# Patient Record
Sex: MALE | Race: WHITE | NOT HISPANIC OR LATINO
[De-identification: names, ages, dates, MRNs, and addresses within clinical notes are randomized per-mention and may not be internally consistent; named-entity substitution may affect disease eponyms.]

---

## 2018-10-25 ENCOUNTER — APPOINTMENT (OUTPATIENT)
Dept: OTOLARYNGOLOGY | Facility: CLINIC | Age: 48
End: 2018-10-25

## 2018-10-26 ENCOUNTER — APPOINTMENT (OUTPATIENT)
Dept: OTOLARYNGOLOGY | Facility: CLINIC | Age: 48
End: 2018-10-26
Payer: COMMERCIAL

## 2018-10-26 VITALS
DIASTOLIC BLOOD PRESSURE: 57 MMHG | HEIGHT: 70 IN | SYSTOLIC BLOOD PRESSURE: 128 MMHG | BODY MASS INDEX: 27.2 KG/M2 | HEART RATE: 86 BPM | WEIGHT: 190 LBS

## 2018-10-26 PROCEDURE — 31231 NASAL ENDOSCOPY DX: CPT

## 2018-10-26 PROCEDURE — 99204 OFFICE O/P NEW MOD 45 MIN: CPT | Mod: 25

## 2018-12-01 ENCOUNTER — OUTPATIENT (OUTPATIENT)
Dept: OUTPATIENT SERVICES | Facility: HOSPITAL | Age: 48
LOS: 1 days | End: 2018-12-01
Payer: COMMERCIAL

## 2018-12-01 VITALS
HEIGHT: 69 IN | SYSTOLIC BLOOD PRESSURE: 114 MMHG | DIASTOLIC BLOOD PRESSURE: 80 MMHG | WEIGHT: 194.01 LBS | OXYGEN SATURATION: 98 % | HEART RATE: 55 BPM | TEMPERATURE: 97 F | RESPIRATION RATE: 14 BRPM

## 2018-12-01 DIAGNOSIS — S62.102A FRACTURE OF UNSPECIFIED CARPAL BONE, LEFT WRIST, INITIAL ENCOUNTER FOR CLOSED FRACTURE: Chronic | ICD-10-CM

## 2018-12-01 DIAGNOSIS — Z98.890 OTHER SPECIFIED POSTPROCEDURAL STATES: Chronic | ICD-10-CM

## 2018-12-01 DIAGNOSIS — I80.229 PHLEBITIS AND THROMBOPHLEBITIS OF UNSPECIFIED POPLITEAL VEIN: ICD-10-CM

## 2018-12-01 DIAGNOSIS — J34.2 DEVIATED NASAL SEPTUM: ICD-10-CM

## 2018-12-01 DIAGNOSIS — M25.569 PAIN IN UNSPECIFIED KNEE: ICD-10-CM

## 2018-12-01 LAB
BUN SERPL-MCNC: 17 MG/DL — SIGNIFICANT CHANGE UP (ref 7–23)
CALCIUM SERPL-MCNC: 9.2 MG/DL — SIGNIFICANT CHANGE UP (ref 8.4–10.5)
CHLORIDE SERPL-SCNC: 103 MMOL/L — SIGNIFICANT CHANGE UP (ref 98–107)
CO2 SERPL-SCNC: 24 MMOL/L — SIGNIFICANT CHANGE UP (ref 22–31)
CREAT SERPL-MCNC: 0.9 MG/DL — SIGNIFICANT CHANGE UP (ref 0.5–1.3)
GLUCOSE SERPL-MCNC: 84 MG/DL — SIGNIFICANT CHANGE UP (ref 70–99)
HCT VFR BLD CALC: 41.1 % — SIGNIFICANT CHANGE UP (ref 39–50)
HGB BLD-MCNC: 13.5 G/DL — SIGNIFICANT CHANGE UP (ref 13–17)
MCHC RBC-ENTMCNC: 31.3 PG — SIGNIFICANT CHANGE UP (ref 27–34)
MCHC RBC-ENTMCNC: 32.8 % — SIGNIFICANT CHANGE UP (ref 32–36)
MCV RBC AUTO: 95.4 FL — SIGNIFICANT CHANGE UP (ref 80–100)
NRBC # FLD: 0 — SIGNIFICANT CHANGE UP
PLATELET # BLD AUTO: 181 K/UL — SIGNIFICANT CHANGE UP (ref 150–400)
PMV BLD: 10 FL — SIGNIFICANT CHANGE UP (ref 7–13)
POTASSIUM SERPL-MCNC: 4.5 MMOL/L — SIGNIFICANT CHANGE UP (ref 3.5–5.3)
POTASSIUM SERPL-SCNC: 4.5 MMOL/L — SIGNIFICANT CHANGE UP (ref 3.5–5.3)
RBC # BLD: 4.31 M/UL — SIGNIFICANT CHANGE UP (ref 4.2–5.8)
RBC # FLD: 12.8 % — SIGNIFICANT CHANGE UP (ref 10.3–14.5)
SODIUM SERPL-SCNC: 139 MMOL/L — SIGNIFICANT CHANGE UP (ref 135–145)
WBC # BLD: 3.95 K/UL — SIGNIFICANT CHANGE UP (ref 3.8–10.5)
WBC # FLD AUTO: 3.95 K/UL — SIGNIFICANT CHANGE UP (ref 3.8–10.5)

## 2018-12-01 PROCEDURE — 93010 ELECTROCARDIOGRAM REPORT: CPT

## 2018-12-01 RX ORDER — SODIUM CHLORIDE 9 MG/ML
1000 INJECTION, SOLUTION INTRAVENOUS
Qty: 0 | Refills: 0 | Status: DISCONTINUED | OUTPATIENT
Start: 2018-12-11 | End: 2018-12-12

## 2018-12-01 NOTE — H&P PST ADULT - ENMT COMMENTS
DX: DX: chronic ethmoidal, sphenoidal sinusitis, deviated nasal septum, chronic maxillary, frontal sinusitis and hypertrophy of nasal turbinates.

## 2018-12-01 NOTE — H&P PST ADULT - NEGATIVE CARDIOVASCULAR SYMPTOMS
no peripheral edema/no orthopnea/no claudication/no dyspnea on exertion/no palpitations/no paroxysmal nocturnal dyspnea/no chest pain

## 2018-12-01 NOTE — H&P PST ADULT - PMH
DVT (deep venous thrombosis)    Femur fracture, left DVT (deep venous thrombosis)    Femur fracture, left  sx sx 2015  Wrist fracture, left

## 2018-12-01 NOTE — H&P PST ADULT - ASSESSMENT
DX: chronic ethmoidal, sphenoidal sinusitis, deviated nasal septum, chronic maxillary, frontal sinusitis and hypertrophy of nasal turbinates. Pt scheduled for a septoplasty, turbinectomy left interoperative CT guided sphenoidectomy, ethmoidectomy, maxillary antrostomy w/ sinus curettage frontal sinus exploration on 12/11/18.

## 2018-12-01 NOTE — H&P PST ADULT - NEGATIVE ENMT SYMPTOMS
no gum bleeding/no throat pain/no dysphagia/no abnormal taste sensation/no dry mouth/no recurrent cold sores/no nose bleeds

## 2018-12-01 NOTE — H&P PST ADULT - HISTORY OF PRESENT ILLNESS
48 y/o male w/ PMH of Left fractured femur and post op developed a small cloth, denies A/C, resolved. (5 years ago) . Presents to PST w/ a preop dx of chronic ethmoidal, sphenoidal sinusitis, deviated nasal septum, chronic maxillary, frontal sinusitis and hypertrophy of nasal turbinates. Pt scheduled for a septoplasty, turbinectomy left interoperative CT guided sphenoidectomy, ethmoidectomy, maxillary antrostomy w/ sinus curettage frontal sinus exploration on 12/11/18. 48 y/o male w/ PMH of Left fractured femur and post op developed a small cloth, denies A/C, resolved. (5 years ago) . Presents to PST w/ a preop dx of chronic ethmoidal, sphenoidal sinusitis, deviated nasal septum, chronic maxillary, frontal sinusitis and hypertrophy of nasal turbinates. Pt scheduled for a septoplasty, turbinectomy left interoperative CT guided sphenoidectomy, ethmoidectomy, maxillary antrostomy w/ sinus curettage frontal sinus exploration on 12/11/18. Pt states hx of chronic sinus infections and recently had one that required several axb and steroids. Pt went for an evaluation w/ Dr Elmore who ordered a CT scan and revealed blocked sinus cavities pt recommended surgical intervention at this time.

## 2018-12-01 NOTE — H&P PST ADULT - NSANTHOSAYNRD_GEN_A_CORE
never tested/No. HERBERTH screening performed.  STOP BANG Legend: 0-2 = LOW Risk; 3-4 = INTERMEDIATE Risk; 5-8 = HIGH Risk

## 2018-12-01 NOTE — H&P PST ADULT - PROBLEM SELECTOR PLAN 1
Preop Dx: chronic ethmoidal, sphenoidal sinusitis, deviated nasal septum, chronic maxillary, frontal sinusitis and hypertrophy of nasal turbinates. Pt scheduled for a septoplasty, turbinectomy left interoperative CT guided sphenoidectomy, ethmoidectomy, maxillary antrostomy w/ sinus curettage frontal sinus exploration on 12/11/18. Preop instructions provided including NPO status, Pepcid, stop NSAIDs on 12/4. Verbalized understanding.   DVT prophylaxis to be maintained.

## 2018-12-02 PROBLEM — S72.92XA UNSPECIFIED FRACTURE OF LEFT FEMUR, INITIAL ENCOUNTER FOR CLOSED FRACTURE: Chronic | Status: ACTIVE | Noted: 2018-12-01

## 2018-12-10 ENCOUNTER — TRANSCRIPTION ENCOUNTER (OUTPATIENT)
Age: 48
End: 2018-12-10

## 2018-12-11 ENCOUNTER — OUTPATIENT (OUTPATIENT)
Dept: OUTPATIENT SERVICES | Facility: HOSPITAL | Age: 48
LOS: 1 days | Discharge: HOME | End: 2018-12-11
Payer: COMMERCIAL

## 2018-12-11 ENCOUNTER — APPOINTMENT (OUTPATIENT)
Dept: OTOLARYNGOLOGY | Facility: HOSPITAL | Age: 48
End: 2018-12-11

## 2018-12-11 ENCOUNTER — RESULT REVIEW (OUTPATIENT)
Age: 48
End: 2018-12-11

## 2018-12-11 VITALS
WEIGHT: 194.01 LBS | OXYGEN SATURATION: 97 % | SYSTOLIC BLOOD PRESSURE: 115 MMHG | HEIGHT: 69 IN | HEART RATE: 68 BPM | RESPIRATION RATE: 12 BRPM | DIASTOLIC BLOOD PRESSURE: 69 MMHG | TEMPERATURE: 98 F

## 2018-12-11 VITALS
DIASTOLIC BLOOD PRESSURE: 66 MMHG | SYSTOLIC BLOOD PRESSURE: 96 MMHG | RESPIRATION RATE: 14 BRPM | OXYGEN SATURATION: 99 % | HEART RATE: 62 BPM

## 2018-12-11 DIAGNOSIS — J34.2 DEVIATED NASAL SEPTUM: ICD-10-CM

## 2018-12-11 DIAGNOSIS — S62.102A FRACTURE OF UNSPECIFIED CARPAL BONE, LEFT WRIST, INITIAL ENCOUNTER FOR CLOSED FRACTURE: Chronic | ICD-10-CM

## 2018-12-11 DIAGNOSIS — Z98.890 OTHER SPECIFIED POSTPROCEDURAL STATES: Chronic | ICD-10-CM

## 2018-12-11 PROCEDURE — 88305 TISSUE EXAM BY PATHOLOGIST: CPT | Mod: 26

## 2018-12-11 PROCEDURE — 30130 EXCISE INFERIOR TURBINATE: CPT

## 2018-12-11 PROCEDURE — 31253 NSL/SINS NDSC TOTAL: CPT

## 2018-12-11 PROCEDURE — 88311 DECALCIFY TISSUE: CPT | Mod: 26

## 2018-12-11 PROCEDURE — 31288 NASAL/SINUS ENDOSCOPY SURG: CPT | Mod: LT

## 2018-12-11 PROCEDURE — 31267 ENDOSCOPY MAXILLARY SINUS: CPT | Mod: LT

## 2018-12-11 PROCEDURE — 30520 REPAIR OF NASAL SEPTUM: CPT

## 2018-12-11 PROCEDURE — 61782 SCAN PROC CRANIAL EXTRA: CPT

## 2018-12-11 PROCEDURE — 88304 TISSUE EXAM BY PATHOLOGIST: CPT | Mod: 26

## 2018-12-11 RX ORDER — OXYCODONE HYDROCHLORIDE 5 MG/1
1 TABLET ORAL
Qty: 20 | Refills: 0 | OUTPATIENT
Start: 2018-12-11

## 2018-12-11 RX ORDER — IBUPROFEN 200 MG
1 TABLET ORAL
Qty: 0 | Refills: 0 | COMMUNITY

## 2018-12-11 RX ORDER — ACETAMINOPHEN 500 MG
2 TABLET ORAL
Qty: 0 | Refills: 0 | COMMUNITY

## 2018-12-11 RX ORDER — HYDROCODONE BITARTRATE 50 MG/1
1 CAPSULE, EXTENDED RELEASE ORAL
Qty: 0 | Refills: 0 | COMMUNITY

## 2018-12-11 RX ORDER — CEPHALEXIN 500 MG
1 CAPSULE ORAL
Qty: 20 | Refills: 0 | OUTPATIENT
Start: 2018-12-11 | End: 2018-12-20

## 2018-12-11 NOTE — ASU DISCHARGE PLAN (ADULT/PEDIATRIC). - MEDICATION SUMMARY - MEDICATIONS TO TAKE
I will START or STAY ON the medications listed below when I get home from the hospital:    acetaminophen-oxyCODONE 325 mg-5 mg oral tablet  -- 1 tab(s) by mouth 4 to 6 times a day MDD:8  -- Caution federal law prohibits the transfer of this drug to any person other  than the person for whom it was prescribed.  May cause drowsiness.  Alcohol may intensify this effect.  Use care when operating dangerous machinery.  This prescription cannot be refilled.  This product contains acetaminophen.  Do not use  with any other product containing acetaminophen to prevent possible liver damage.  Using more of this medication than prescribed may cause serious breathing problems.    -- Indication: For as needed for pain medication    Keflex 500 mg oral capsule  -- 1 cap(s) by mouth 2 times a day  -- Finish all this medication unless otherwise directed by prescriber.    -- Indication: For antibitoic treatment for 10 days

## 2018-12-11 NOTE — ASU DISCHARGE PLAN (ADULT/PEDIATRIC). - NURSING INSTRUCTIONS
You were given 1000mg IV Tylenol for pain management.  Please DO NOT take any Tylenol containing products, such as  Vicodin, Percocet, Excedrin, many cold preparations for the next 6 hours (until 145p).  DO NOT EXCEED 3000MG OF TYLENOL OVER 24 HOURS.

## 2018-12-12 ENCOUNTER — APPOINTMENT (OUTPATIENT)
Dept: OTOLARYNGOLOGY | Facility: CLINIC | Age: 48
End: 2018-12-12
Payer: COMMERCIAL

## 2018-12-12 PROBLEM — I82.409 ACUTE EMBOLISM AND THROMBOSIS OF UNSPECIFIED DEEP VEINS OF UNSPECIFIED LOWER EXTREMITY: Chronic | Status: ACTIVE | Noted: 2018-12-01

## 2018-12-12 PROBLEM — S62.102A FRACTURE OF UNSPECIFIED CARPAL BONE, LEFT WRIST, INITIAL ENCOUNTER FOR CLOSED FRACTURE: Chronic | Status: ACTIVE | Noted: 2018-12-01

## 2018-12-12 PROCEDURE — 99024 POSTOP FOLLOW-UP VISIT: CPT

## 2018-12-14 LAB — SURGICAL PATHOLOGY STUDY: SIGNIFICANT CHANGE UP

## 2018-12-19 ENCOUNTER — APPOINTMENT (OUTPATIENT)
Dept: OTOLARYNGOLOGY | Facility: CLINIC | Age: 48
End: 2018-12-19
Payer: COMMERCIAL

## 2018-12-19 PROCEDURE — 99024 POSTOP FOLLOW-UP VISIT: CPT

## 2019-03-15 ENCOUNTER — APPOINTMENT (OUTPATIENT)
Dept: OTOLARYNGOLOGY | Facility: CLINIC | Age: 49
End: 2019-03-15

## 2019-08-07 ENCOUNTER — EMERGENCY (EMERGENCY)
Facility: HOSPITAL | Age: 49
LOS: 1 days | Discharge: HOME | End: 2019-08-07
Attending: EMERGENCY MEDICINE
Payer: COMMERCIAL

## 2019-08-07 VITALS
RESPIRATION RATE: 18 BRPM | OXYGEN SATURATION: 97 % | WEIGHT: 184.97 LBS | HEART RATE: 71 BPM | HEIGHT: 70 IN | TEMPERATURE: 98 F | DIASTOLIC BLOOD PRESSURE: 66 MMHG | SYSTOLIC BLOOD PRESSURE: 129 MMHG

## 2019-08-07 VITALS
DIASTOLIC BLOOD PRESSURE: 84 MMHG | SYSTOLIC BLOOD PRESSURE: 148 MMHG | OXYGEN SATURATION: 99 % | RESPIRATION RATE: 18 BRPM | HEART RATE: 65 BPM

## 2019-08-07 DIAGNOSIS — Z98.890 OTHER SPECIFIED POSTPROCEDURAL STATES: Chronic | ICD-10-CM

## 2019-08-07 DIAGNOSIS — S62.102A FRACTURE OF UNSPECIFIED CARPAL BONE, LEFT WRIST, INITIAL ENCOUNTER FOR CLOSED FRACTURE: Chronic | ICD-10-CM

## 2019-08-07 LAB
ALBUMIN SERPL ELPH-MCNC: 4.7 G/DL — SIGNIFICANT CHANGE UP (ref 3.3–5)
ALP SERPL-CCNC: 64 U/L — SIGNIFICANT CHANGE UP (ref 40–120)
ALT FLD-CCNC: 24 U/L — SIGNIFICANT CHANGE UP (ref 10–45)
ANION GAP SERPL CALC-SCNC: 16 MMOL/L — SIGNIFICANT CHANGE UP (ref 5–17)
AST SERPL-CCNC: 32 U/L — SIGNIFICANT CHANGE UP (ref 10–40)
BASOPHILS # BLD AUTO: 0 K/UL — SIGNIFICANT CHANGE UP (ref 0–0.2)
BASOPHILS NFR BLD AUTO: 0.6 % — SIGNIFICANT CHANGE UP (ref 0–2)
BILIRUB SERPL-MCNC: 1.6 MG/DL — HIGH (ref 0.2–1.2)
BUN SERPL-MCNC: 9 MG/DL — SIGNIFICANT CHANGE UP (ref 7–23)
CALCIUM SERPL-MCNC: 9.8 MG/DL — SIGNIFICANT CHANGE UP (ref 8.4–10.5)
CHLORIDE SERPL-SCNC: 93 MMOL/L — LOW (ref 96–108)
CO2 SERPL-SCNC: 22 MMOL/L — SIGNIFICANT CHANGE UP (ref 22–31)
CREAT SERPL-MCNC: 0.88 MG/DL — SIGNIFICANT CHANGE UP (ref 0.5–1.3)
EOSINOPHIL # BLD AUTO: 0.1 K/UL — SIGNIFICANT CHANGE UP (ref 0–0.5)
EOSINOPHIL NFR BLD AUTO: 1.9 % — SIGNIFICANT CHANGE UP (ref 0–6)
GLUCOSE SERPL-MCNC: 85 MG/DL — SIGNIFICANT CHANGE UP (ref 70–99)
HCT VFR BLD CALC: 44 % — SIGNIFICANT CHANGE UP (ref 39–50)
HGB BLD-MCNC: 14.5 G/DL — SIGNIFICANT CHANGE UP (ref 13–17)
LYMPHOCYTES # BLD AUTO: 1.8 K/UL — SIGNIFICANT CHANGE UP (ref 1–3.3)
LYMPHOCYTES # BLD AUTO: 24.2 % — SIGNIFICANT CHANGE UP (ref 13–44)
MCHC RBC-ENTMCNC: 31.8 PG — SIGNIFICANT CHANGE UP (ref 27–34)
MCHC RBC-ENTMCNC: 33 GM/DL — SIGNIFICANT CHANGE UP (ref 32–36)
MCV RBC AUTO: 96.4 FL — SIGNIFICANT CHANGE UP (ref 80–100)
MONOCYTES # BLD AUTO: 0.6 K/UL — SIGNIFICANT CHANGE UP (ref 0–0.9)
MONOCYTES NFR BLD AUTO: 8.8 % — SIGNIFICANT CHANGE UP (ref 2–14)
NEUTROPHILS # BLD AUTO: 4.7 K/UL — SIGNIFICANT CHANGE UP (ref 1.8–7.4)
NEUTROPHILS NFR BLD AUTO: 64.5 % — SIGNIFICANT CHANGE UP (ref 43–77)
PLATELET # BLD AUTO: 199 K/UL — SIGNIFICANT CHANGE UP (ref 150–400)
POTASSIUM SERPL-MCNC: 4.5 MMOL/L — SIGNIFICANT CHANGE UP (ref 3.5–5.3)
POTASSIUM SERPL-SCNC: 4.5 MMOL/L — SIGNIFICANT CHANGE UP (ref 3.5–5.3)
PROT SERPL-MCNC: 7.4 G/DL — SIGNIFICANT CHANGE UP (ref 6–8.3)
RBC # BLD: 4.57 M/UL — SIGNIFICANT CHANGE UP (ref 4.2–5.8)
RBC # FLD: 11.8 % — SIGNIFICANT CHANGE UP (ref 10.3–14.5)
SODIUM SERPL-SCNC: 131 MMOL/L — LOW (ref 135–145)
WBC # BLD: 7.3 K/UL — SIGNIFICANT CHANGE UP (ref 3.8–10.5)
WBC # FLD AUTO: 7.3 K/UL — SIGNIFICANT CHANGE UP (ref 3.8–10.5)

## 2019-08-07 PROCEDURE — 96375 TX/PRO/DX INJ NEW DRUG ADDON: CPT

## 2019-08-07 PROCEDURE — 99284 EMERGENCY DEPT VISIT MOD MDM: CPT | Mod: 25

## 2019-08-07 PROCEDURE — 85027 COMPLETE CBC AUTOMATED: CPT

## 2019-08-07 PROCEDURE — 96374 THER/PROPH/DIAG INJ IV PUSH: CPT

## 2019-08-07 PROCEDURE — 99284 EMERGENCY DEPT VISIT MOD MDM: CPT

## 2019-08-07 PROCEDURE — 80053 COMPREHEN METABOLIC PANEL: CPT

## 2019-08-07 RX ORDER — VALPROIC ACID (AS SODIUM SALT) 250 MG/5ML
500 SOLUTION, ORAL ORAL ONCE
Refills: 0 | Status: COMPLETED | OUTPATIENT
Start: 2019-08-07 | End: 2019-08-07

## 2019-08-07 RX ORDER — KETOROLAC TROMETHAMINE 30 MG/ML
30 SYRINGE (ML) INJECTION ONCE
Refills: 0 | Status: DISCONTINUED | OUTPATIENT
Start: 2019-08-07 | End: 2019-08-07

## 2019-08-07 RX ORDER — CARBAMAZEPINE 200 MG
1 TABLET ORAL
Qty: 10 | Refills: 0
Start: 2019-08-07 | End: 2019-08-16

## 2019-08-07 RX ADMIN — Medication 30 MILLIGRAM(S): at 17:05

## 2019-08-07 RX ADMIN — Medication 110 MILLIGRAM(S): at 17:36

## 2019-08-07 NOTE — ED PROVIDER NOTE - ENMT, MLM
Airway patent, Nasal mucosa clear. Mouth with normal mucosa. Throat has no vesicles, no oropharyngeal exudates and uvula is midline. Able to fully open and close mouth

## 2019-08-07 NOTE — ED PROVIDER NOTE - NSFOLLOWUPINSTRUCTIONS_ED_ALL_ED_FT
Take medication as directed.  Follow up with neurologist tomorrow as discussed.  Return to ER for any worsening or concerning symptoms

## 2019-08-07 NOTE — ED ADULT NURSE NOTE - OBJECTIVE STATEMENT
Male 48 years old with no medical history came in for right facial pain. Pt reports he had dental work up 2 weeks ago, last Friday night he had sharp pain on right side of his neck radiating to his face and head, saw a dentist again and was told everything was alright. Used ice pack with relief. Last night pain started again until this morning. Reports today was worse, sharp, burning and throbbing like pain. Denies vision change, fever, chills, nausea and vomiting. Will continue  to reassess.

## 2019-08-07 NOTE — ED PROVIDER NOTE - CLINICAL SUMMARY MEDICAL DECISION MAKING FREE TEXT BOX
47yo M denies pmhx presents to ER c/o of burning/shooting pain to right side of face x 5days.  Cleared by dentist.  Likely Trigeminal neuralgia   -Pain control, basic labs.

## 2019-08-07 NOTE — ED PROVIDER NOTE - OBJECTIVE STATEMENT
49yo M denies pmhx presents to ER c/o of burning/shooting pain to right side of face x 5days.  patient reports had dental work ~4monhts ago and at first thought it was a dental issue began is a teeth - went back to dental had teeth shaved and xrays and cleared.  Bought a teeth  mouth piece and pain resolved for 1 day, however returned.  Patient has been putting ICE and heat on face, taking naproxen with no relief.  Ray fever, chills, CP, SOB< dizziness, visual changes, speech changes, trouble swallowing, numbness/tingling, facial droop

## 2019-08-07 NOTE — ED PROVIDER NOTE - ATTENDING CONTRIBUTION TO CARE
Shooting and burning pain R lower face for a week, no fever, neuro intact, likely trigeminal neuralgia.

## 2019-08-09 ENCOUNTER — EMERGENCY (EMERGENCY)
Facility: HOSPITAL | Age: 49
LOS: 1 days | Discharge: HOME | End: 2019-08-09
Attending: EMERGENCY MEDICINE
Payer: COMMERCIAL

## 2019-08-09 VITALS
DIASTOLIC BLOOD PRESSURE: 87 MMHG | HEART RATE: 54 BPM | RESPIRATION RATE: 17 BRPM | SYSTOLIC BLOOD PRESSURE: 125 MMHG | OXYGEN SATURATION: 99 %

## 2019-08-09 VITALS
OXYGEN SATURATION: 98 % | TEMPERATURE: 98 F | HEART RATE: 53 BPM | RESPIRATION RATE: 20 BRPM | SYSTOLIC BLOOD PRESSURE: 120 MMHG | DIASTOLIC BLOOD PRESSURE: 71 MMHG

## 2019-08-09 DIAGNOSIS — S62.102A FRACTURE OF UNSPECIFIED CARPAL BONE, LEFT WRIST, INITIAL ENCOUNTER FOR CLOSED FRACTURE: Chronic | ICD-10-CM

## 2019-08-09 DIAGNOSIS — Z98.890 OTHER SPECIFIED POSTPROCEDURAL STATES: Chronic | ICD-10-CM

## 2019-08-09 PROCEDURE — 99283 EMERGENCY DEPT VISIT LOW MDM: CPT

## 2019-08-09 PROCEDURE — 99283 EMERGENCY DEPT VISIT LOW MDM: CPT | Mod: 25

## 2019-08-09 PROCEDURE — 96372 THER/PROPH/DIAG INJ SC/IM: CPT

## 2019-08-09 RX ORDER — KETOROLAC TROMETHAMINE 30 MG/ML
30 SYRINGE (ML) INJECTION ONCE
Refills: 0 | Status: DISCONTINUED | OUTPATIENT
Start: 2019-08-09 | End: 2019-08-09

## 2019-08-09 RX ORDER — KETOROLAC TROMETHAMINE 30 MG/ML
60 SYRINGE (ML) INJECTION ONCE
Refills: 0 | Status: DISCONTINUED | OUTPATIENT
Start: 2019-08-09 | End: 2019-08-09

## 2019-08-09 RX ADMIN — Medication 60 MILLIGRAM(S): at 06:45

## 2019-08-09 RX ADMIN — Medication 60 MILLIGRAM(S): at 06:03

## 2019-08-09 NOTE — ED ADULT TRIAGE NOTE - CHIEF COMPLAINT QUOTE
rt sided pain returning fr dc 8/7/19  040 pt drinking cold water in triage unable to get temp oral reading rt sided facial pain returning fr dc 8/7/19  0400 pt drinking cold water in triage unable to get temp oral reading

## 2019-08-09 NOTE — ED PROVIDER NOTE - CLINICAL SUMMARY MEDICAL DECISION MAKING FREE TEXT BOX
47 y/o M p/w right-sided facial neuralgia of unknown etiology 49 y/o M p/w right-sided facial neuralgia of unknown etiology, here 2 days ago for similar sx. Patient states he has had difficulty sleeping tonight because the pain has gotten worse. 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 Scheduled for outpatient MRI/MRA/EEG today. PEx reveals no focal neuro deficits. Patient given ketorolac 30 w/ minimal relief. Offered gabapentin or oxycodone, but patient declinined stating he will f/u with his neurologist and attend his scheduled imaging studies. Referral info also given on [facility]'s atypical facial pain clinic. Patient discharged in stable condition.

## 2019-08-09 NOTE — ED ADULT NURSE NOTE - NSIMPLEMENTINTERV_GEN_ALL_ED
Implemented All Universal Safety Interventions:  [place] to call system. Call bell, personal items and telephone within reach. Instruct patient to call for assistance. Room bathroom lighting operational. Non-slip footwear when patient is off stretcher. Physically safe environment: no spills, clutter or unnecessary equipment. Stretcher in lowest position, wheels locked, appropriate side rails in place.

## 2019-08-09 NOTE — ED ADULT NURSE NOTE - OBJECTIVE STATEMENT
48 year old male presents to the ED via walk in with c/o right jaw pain. Pt was seen in ED two days ago for same symptoms, diagnosed with trigeminal neuralgia but symptoms have not resolved. Pt f/u with neuro yesterday and recently seen by 2 dentists to r/o tooth pain. Denies c/o CP, SOB, N/V/D. Comfort and safety measures maintained.

## 2019-08-09 NOTE — ED PROVIDER NOTE - NSFOLLOWUPINSTRUCTIONS_ED_ALL_ED_FT
You were seen in the Emergency Department for right facial neuralgia.  1) Advance activity as tolerated.    2) Continue all previously prescribed medications as directed.    3) Follow up with your neurologist in 3-5 days.   4) Return to the Emergency Department for worsening or persistent symptoms, and/or ANY NEW OR CONCERNING SYMPTOMS including but not limited to worsening facial pain, facial droop, sudden changes in muscle strength/sensation, or sudden fainting.

## 2019-08-09 NOTE — ED PROVIDER NOTE - NSFOLLOWUPCLINICS_GEN_ALL_ED_FT
[facility]  Neurology  [address] [place] [address] - [address] [place] [address]  Phone: [phone]  Fax:   Follow Up Time: 4-6 Days

## 2019-08-09 NOTE — ED PROVIDER NOTE - ATTENDING CONTRIBUTION TO CARE
Afebrile. Awake and Alert. CN II-XII intact. No TMJ TTP. Dentition and gums unremarkable.    Right facial neuralgia: Following with neurology. Toradol IM per request. Pt declined opiates.  Pt given number to [facility] Facial pain center.  No signs of dental abscess, Bell's palsy or TMJ d/o

## 2019-08-09 NOTE — ED PROVIDER NOTE - OBJECTIVE STATEMENT
49 y/o p/w burning/shooting pain to right side of face, was discharged 2 days ago for same issue. 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 Patient states these medications have not been working, but that when he was in the ED Toradol and depakote greatly improved symptoms. He is scheduled by his outpatient neurologist for an MRI, MRA and EEG tonight. Patient reports had dental work 4 months ago and is a teeth grindr--his dentist had his teeth shaved and x-rays were clear. Bought a mouth guard for grinding prevention and pain resolved for 1 day, but later returned. Patient has been icing face, drinking water with minimal relief. Denies fevers/chills, CP, SOB, dizziness, visual changes, dysarthria, dysphagia, numbness/tingling or facial droop, 49 y/o M p/w burning/shooting pain to right side of face, was discharged 2 days ago for same issue. 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 Patient states these medications have not been working, but that when he was in the ED Toradol and depakote greatly improved symptoms. He is scheduled by his outpatient neurologist for an MRI, MRA and EEG tonight. Patient reports had dental work 4 months ago and is a teeth grindr--his dentist had his teeth shaved and x-rays were clear. Bought a mouth guard for grinding prevention and pain resolved for 1 day, but later returned. Patient has been icing face, drinking water with minimal relief. Denies fevers/chills, CP, SOB, dizziness, visual changes, dysarthria, dysphagia, numbness/tingling or facial droop,

## 2019-08-16 ENCOUNTER — APPOINTMENT (OUTPATIENT)
Dept: OTOLARYNGOLOGY | Facility: CLINIC | Age: 49
End: 2019-08-16

## 2019-08-16 ENCOUNTER — APPOINTMENT (OUTPATIENT)
Dept: OTOLARYNGOLOGY | Facility: CLINIC | Age: 49
End: 2019-08-16
Payer: COMMERCIAL

## 2019-08-16 VITALS
HEART RATE: 69 BPM | SYSTOLIC BLOOD PRESSURE: 135 MMHG | BODY MASS INDEX: 27.2 KG/M2 | WEIGHT: 190 LBS | DIASTOLIC BLOOD PRESSURE: 82 MMHG | HEIGHT: 70 IN

## 2019-08-16 DIAGNOSIS — R51 HEADACHE: ICD-10-CM

## 2019-08-16 PROCEDURE — 99214 OFFICE O/P EST MOD 30 MIN: CPT

## 2019-08-16 RX ORDER — METHYLPREDNISOLONE 4 MG/1
4 TABLET ORAL
Qty: 1 | Refills: 1 | Status: DISCONTINUED | COMMUNITY
Start: 2018-10-26 | End: 2019-08-16

## 2019-08-16 RX ORDER — SULFAMETHOXAZOLE AND TRIMETHOPRIM 400; 80 MG/1; MG/1
TABLET ORAL
Refills: 0 | Status: DISCONTINUED | COMMUNITY
End: 2019-08-16

## 2019-08-16 NOTE — HISTORY OF PRESENT ILLNESS
[de-identified] : 48 year old male follow up s/p Septoplasty, turbinectomy, left endoscopic intraoperative CTguided sphenoidectomy, ethmoidectomy, maxillary  antrostomy, and frontal sinus exploration 12/11/18.  States had constant right sided facial pain started about 2 weeks ago, went to dentist, eliminated it as dental issue.  Went 8/7/19 went to ER, treated for trigeminal neuralgia with Toradol and Depakote, followed up with neurologist 8/8/19, was told it was not trigeminal neuralgia, said it was paroxysmal hemicrania, given a medication (unknown) and oxcarbazepine.  Constant pain did not go away, went back to ER 8/9/19, got Toradol injection.   Called neurologist, was prescribed Prednisone, verapamil, and told to continue oxcarbazepine, and sumatriptan infections.  States pain became intermittent, episodes were worse after eating dinners/chewing.  Went to [facility] 8/12/19.  States pain resolved 8/14/19.   States has to have dental implants, dentist wants to do sinus lift.

## 2019-08-16 NOTE — PROCEDURE
[Topical Lidocaine] : topical lidocaine [Image(s) Captured] : image(s) captured and filed [Oxymetazoline HCl] : oxymetazoline HCl [Flexible Endoscope] : examined with the flexible endoscope [Serial Number: ___] : Serial Number: [unfilled] [Osteomeatal Pathology] : osteomeatal pathology [Recalcitrant Symptoms] : recalcitrant symptoms  [Anatomical Abnormality] : anatomical abnormality [Anterior rhinoscopy insufficient to account for symptoms] : anterior rhinoscopy insufficient to account for symptoms [Normal] : the middle meatus had no abnormalities [Paranasal Sinuses Middle Meatus] : no polyps [Paranasal Sinuses Maxillary Sinus] : no maxillary polyps [Paranasal Sinuses Ethmoid Sinus] : no ethmoid polyps [Paranasal Sinuses Sphenoid Sinus] : no spenoid polyps [FreeTextEntry6] : Pre-op indication(s): right side facial pain, left future dental implants\par Post-op indication(s): Left side open and clear and.\par Verbal consent obtained from patient.\par “Anterior rhinoscopy insufficient to account for symptoms” \par Details for procedure: \par Scope #: Is he is as you he is a was able cleared\par Type of scope:    flexible fiber optic telescope  71size triggering pressure   Rigid glass telescope \par Anesthesia and/or vasoconstriction was achieved topically by using: \par 4% Lidocaine spray   0.05% Oxymetazoline     Other ______ \par The following anatomic sites were directly examined in a sequential fashion: \par The scope was introduced in the nasal passage between the middle and inferior turbinates to exam the inferior portion of the middle meatus and the fontanelle, as well as the maxillary ostia. Next, the scope was passed medially and posteriorly to the middle turbinates to examine the sphenoethmoid recess and the superior turbinate region. \par Upon visualization the finders are as follows: \par Nasal Septum:   Normal    Deviated to   left    right   Spur left   right   Perforation    Crust \par Bleeding site cauterized:    Anterior   left   right   Posterior   left   right \par Method:   Silver Nitrate   YAG Laser    Electrocautery ______ \par Right Side: \par * Mucosa: Normal\par * Mucous: Normal\par * Polyp: Normal\par * Inferior Turbinate: Normal\par * Middle Turbinate: Normal\par * Superior Turbinate: Normal\par * Inferior Meatus: Normal\par * Middle Meatus: Normal\par * Super Meatus: Normal\par * Sphenoethmoidal Recess: Normal\par Left Side: \par * Mucosa: Normal\par * Mucous: Normal\par * Polyp: Normal\par * Inferior Turbinate: Normal\par * Middle Turbinate: Normal\par * Superior Turbinate: Normal\par * Inferior Meatus: Normal\par * Middle Meatus: Normal\par * Super Meatus: Normal\par * Sphenoethmoidal Recess: Normal\par The patient tolerated the procedure well without any complications.\par \par \par He is to the right ovary me in the

## 2019-08-16 NOTE — REASON FOR VISIT
[Subsequent Evaluation] : a subsequent evaluation for [Other: _____] : [unfilled] [FreeTextEntry2] : follow up s/p Septoplasty, turbinectomy, left endoscopic intraoperative CTguided sphenoidectomy, ethmoidectomy, maxillary  antrostomy, and frontal sinus exploration 12/11/18.\par

## 2019-08-26 ENCOUNTER — FORM ENCOUNTER (OUTPATIENT)
Age: 49
End: 2019-08-26

## 2019-08-27 ENCOUNTER — APPOINTMENT (OUTPATIENT)
Dept: CT IMAGING | Facility: IMAGING CENTER | Age: 49
End: 2019-08-27
Payer: COMMERCIAL

## 2019-08-27 ENCOUNTER — OUTPATIENT (OUTPATIENT)
Dept: OUTPATIENT SERVICES | Facility: HOSPITAL | Age: 49
LOS: 1 days | End: 2019-08-27
Payer: COMMERCIAL

## 2019-08-27 DIAGNOSIS — S62.102A FRACTURE OF UNSPECIFIED CARPAL BONE, LEFT WRIST, INITIAL ENCOUNTER FOR CLOSED FRACTURE: Chronic | ICD-10-CM

## 2019-08-27 DIAGNOSIS — R51 HEADACHE: ICD-10-CM

## 2019-08-27 DIAGNOSIS — J32.9 CHRONIC SINUSITIS, UNSPECIFIED: ICD-10-CM

## 2019-08-27 DIAGNOSIS — Z98.890 OTHER SPECIFIED POSTPROCEDURAL STATES: Chronic | ICD-10-CM

## 2019-08-27 PROCEDURE — 70486 CT MAXILLOFACIAL W/O DYE: CPT | Mod: 26

## 2019-08-27 PROCEDURE — 70486 CT MAXILLOFACIAL W/O DYE: CPT

## 2019-11-06 NOTE — ASU DISCHARGE PLAN (ADULT/PEDIATRIC). - CONDITIONS AT DISCHARGE
Patient is stable and meets discharge criteria. Patient made aware that he/she must wait on unit to be escorted to awaiting car in front of the main building after being discharged by Anesthesia Department.
no

## 2020-12-09 NOTE — H&P PST ADULT - PROBLEM/PLAN-1
Problem: Pain  Goal: #Acceptable pain level achieved/maintained at rest using NRS/Faces  Description: This goal is used for patients who can self-report. Acceptable means the level is at or below the identified comfort/function goal.  Outcome: Outcome Met, Continue evaluating goal progress toward completion     Problem: Pressure Injury, Risk for  Goal: # Skin remains intact  Outcome: Outcome Met, Continue evaluating goal progress toward completion     Problem: VTE, Risk for  Goal: # No s/s of VTE  Outcome: Outcome Met, Continue evaluating goal progress toward completion     Problem:  At Risk for Falls  Goal: # Patient does not fall  Outcome: Outcome Met, Continue evaluating goal progress toward completion DISPLAY PLAN FREE TEXT

## 2021-03-03 NOTE — ED PROVIDER NOTE - TOBACCO USE
Never smoker Sski Pregnancy And Lactation Text: This medication is Pregnancy Category D and isn't considered safe during pregnancy. It is excreted in breast milk.

## 2022-01-31 NOTE — ED PROVIDER NOTE - DISCHARGE DATE
Detail Level: Detailed Quality 226: Preventive Care And Screening: Tobacco Use: Screening And Cessation Intervention: Patient screened for tobacco use and is an ex/non-smoker 07-Aug-2019

## 2022-02-21 NOTE — H&P PST ADULT - TONSILS
Encounter Date: 2/21/2022       History     Chief Complaint   Patient presents with    Abdominal Pain     Midepigastric pain that wraps around abdomen and radiates to back x 3 days. Pain is worsened at night. Pain improves with tylenol. No n/v/d. VSS in triage. Last BM was this am and normal.      60-year-old female, PMH HTN, presents to ED with concern of abdominal pain that began 3 days ago.  Pain described as sharp, worsened after meals or during the evening, predominately epigastric region but radiating towards right upper quadrant and towards back, mildly improved with Tylenol, severity 8/10.  Denies history of similar symptoms.  Denies previous abdominal surgeries.  Does have mild associated nausea but no vomiting, diarrhea, constipation, chest pain, shortness of breath, cough.  She does have intermittent very mild dysuria but no changes in urine color or frequency.  Last BM this morning; uneventful with no melena or BRB.  No other acute complaints at this time.    The history is provided by the patient.     Review of patient's allergies indicates:  No Known Allergies  No past medical history on file.  No past surgical history on file.  No family history on file.     Review of Systems   Constitutional: Negative for chills and fever.   Respiratory: Negative for cough and shortness of breath.    Cardiovascular: Negative for chest pain.   Gastrointestinal: Positive for abdominal pain and nausea. Negative for blood in stool, constipation, diarrhea and vomiting.   Genitourinary: Negative for difficulty urinating and hematuria.   Musculoskeletal: Negative for neck pain and neck stiffness.   Skin: Negative for rash.       Physical Exam     Initial Vitals [02/21/22 0852]   BP Pulse Resp Temp SpO2   (!) 163/80 (!) 112 20 98 °F (36.7 °C) 100 %      MAP       --         Physical Exam    Nursing note and vitals reviewed.  Constitutional: She appears well-developed and well-nourished. She is cooperative. She does not have  a sickly appearance. She does not appear ill. No distress.   HENT:   Head: Normocephalic and atraumatic.   Eyes: EOM are normal.   Neck:   Normal range of motion.  Cardiovascular: Normal rate, regular rhythm and normal heart sounds.   Pulmonary/Chest: Effort normal and breath sounds normal.   Abdominal: Abdomen is soft. There is abdominal tenderness in the right upper quadrant and epigastric area.   Abdominal tenderness to epigastric region, radiating towards right upper quadrant.  No guarding.  No distention.  No overlying skin changes.  No CVA tenderness.   Musculoskeletal:      Cervical back: Normal range of motion.     Neurological: She is alert. She is not disoriented. GCS eye subscore is 4. GCS verbal subscore is 5. GCS motor subscore is 6.   Skin: Skin is warm and dry.   Psychiatric: She has a normal mood and affect. Her speech is normal and behavior is normal.         ED Course   Procedures  Labs Reviewed   CBC W/ AUTO DIFFERENTIAL - Abnormal; Notable for the following components:       Result Value    MCV 78 (*)     MCH 25.0 (*)     RDW 14.6 (*)     All other components within normal limits   COMPREHENSIVE METABOLIC PANEL - Abnormal; Notable for the following components:    Alkaline Phosphatase 183 (*)     AST 50 (*)     ALT 63 (*)     All other components within normal limits   URINALYSIS, REFLEX TO URINE CULTURE - Abnormal; Notable for the following components:    Protein, UA Trace (*)     Leukocytes, UA Trace (*)     All other components within normal limits    Narrative:     Specimen Source->Urine   LIPASE   TROPONIN I   URINALYSIS MICROSCOPIC    Narrative:     Specimen Source->Urine        ECG Results          EKG 12-lead (Final result)  Result time 02/21/22 10:18:52    Final result by Interface, Lab In [facility] (02/21/22 10:18:52)                 Narrative:    Test Reason : R10.13,    Vent. Rate : 079 BPM     Atrial Rate : 079 BPM     P-R Int : 174 ms          QRS Dur : 086 ms      QT Int : 384 ms        P-R-T Axes : 072 -10 043 degrees     QTc Int : 440 ms    Normal sinus rhythm  Low voltage QRS  Borderline Abnormal ECG  No previous ECGs available  Confirmed by Sukhwinder Hauser MD ([id number]) on 2/21/2022 10:18:37 AM    Referred By: [email or web address]   SELF           Confirmed By:Sukhwinder Hauser MD                            Imaging Results          US Abdomen Complete (Final result)  Result time 02/21/22 13:48:01    Final result by Mariza Pickering MD (02/21/22 13:48:01)                 Impression:      Hepatomegaly and ultrasound findings suggestive of hepatic steatosis.  Otherwise unremarkable.      Electronically signed by: Mariza Pickering  Date:    02/21/2022  Time:    13:48             Narrative:    EXAMINATION:  US ABDOMEN COMPLETE    CLINICAL HISTORY:  Right upper quadrant pain    TECHNIQUE:  Complete abdominal ultrasound (including pancreas, aorta, liver, gallbladder, common bile duct, IVC, kidneys, and spleen) was performed.    COMPARISON:  None    FINDINGS:  Pancreas: The visualized portions of pancreas appear normal.    Aorta: No aneurysm.    Liver: 16.1 cm, enlarged.  Homogeneously hyperechoic echotexture.  No focal lesions.    Gallbladder: No calculi, wall thickening, or pericholecystic fluid.  Negative sonographic King's sign.    Biliary system: 3 mm common bile duct.  No intrahepatic ductal dilatation.    Inferior vena cava: Normal in appearance.    Right kidney: 8.1 cm. No hydronephrosis.    Left kidney: 11.9 cm. No hydronephrosis.    Spleen: 13.7 x 4.8 cm.  Normal in size with homogeneous echotexture.    Miscellaneous: No ascites.                                 Medications   sodium chloride 0.9% bolus 1,000 mL (0 mLs Intravenous Stopped 2/21/22 1014)   ketorolac injection 15 mg (15 mg Intravenous Given 2/21/22 0935)   ondansetron injection 4 mg (4 mg Intravenous Given 2/21/22 0935)   morphine injection 4 mg (4 mg Intravenous Given 2/21/22 1219)   aluminum-magnesium hydroxide-simethicone 200-200-20 mg/5  mL suspension 30 mL (30 mLs Oral Given 2/21/22 1443)     And   LIDOcaine HCl 2% oral solution 10 mL (10 mLs Oral Given 2/21/22 1443)     Medical Decision Making:   Initial Assessment:   Patient presents with concern of 3 day onset epigastric pain radiating towards right upper quadrant.  Does have mild nausea but no vomiting or bowel complications.  Afebrile arrival with vitals grossly unremarkable.  On exam, tenderness noted to epigastric region extending right upper quadrant.  No guarding.  Differential Diagnosis:   GERD, intestinal spasm, gastroenteritis, gastritis, ulcer, cholecystitis, cholelithiasis, gallstones, pancreatitis, ileus, small bowel obstruction, appendicitis, diverticulitis, colitis, constipation, intestinal gas pain, UTI, pyelonephritis, urolithiasis, nephrolithiasis, less likely ACS    ED Management:  CBC/CMP, lipase, UA, troponin, EKG    EKG NSR, rate 79, no acute ST elevations.  Troponin WNL.  CBC grossly unremarkable.  CMP noting slight elevation LFTs.  Lipase WNL.  UA unremarkable.  Patient reporting no improvement with IV Toradol.  Will proceed with abdominal ultrasound and IV morphine.    2:02 PM  Ultrasound showing no significant findings.  Patient reporting no pain control with IV morphine.  Continues report pain across upper abdomen.  Will give GI cocktail and order CT abdomen pelvis    Patient reporting GI cocktail has resolved her abdominal pain and she is eager to return home at this time.  She has deferred offer to proceed with CT.  I do suspect symptoms to be due to gastritis versus peptic ulcer.  Prescription written for Protonix and Bentyl.  Encouraged bland diet, oral hydration, rest, monitor symptoms closely, close PCP/GI follow-up.  [place] further [place] and close follow-up.  ED return precautions discussed.  Patient states her understanding and agrees with plan.    Patient discussed with attending, Dr. Rojas, who agrees with ED course  and dispo.    Patient's family member at bedside interpreted discussion between myself patient.  She was offered  at no cost, but has deferred offer                      Clinical Impression:   Final diagnoses:  [R10.13] Epigastric pain (Primary)  [R10.11] RUQ pain          ED Disposition Condition    Discharge Stable        ED Prescriptions     Medication Sig Dispense Start Date End Date Auth. Provider    pantoprazole (PROTONIX) 20 MG tablet Take 2 tablets (40 mg total) by mouth once daily. 30 tablet 2/21/2022 2/21/2023 Antony John PA-C    dicyclomine (BENTYL) 20 mg tablet Take 1 tablet (20 mg total) by mouth 2 (two) times daily. 20 tablet 2/21/2022 3/23/2022 Antony John PA-C        Follow-up Information    None          Antony John PA-C  02/21/22 3542       Antony John PA-C  02/21/22 0381     no redness/no swelling

## 2023-02-03 ENCOUNTER — NON-APPOINTMENT (OUTPATIENT)
Age: 53
End: 2023-02-03

## 2023-02-15 ENCOUNTER — APPOINTMENT (OUTPATIENT)
Dept: GASTROENTEROLOGY | Facility: CLINIC | Age: 53
End: 2023-02-15
Payer: COMMERCIAL

## 2023-02-15 VITALS
DIASTOLIC BLOOD PRESSURE: 84 MMHG | OXYGEN SATURATION: 97 % | WEIGHT: 231 LBS | TEMPERATURE: 98 F | HEART RATE: 86 BPM | SYSTOLIC BLOOD PRESSURE: 114 MMHG | HEIGHT: 70 IN | RESPIRATION RATE: 14 BRPM | BODY MASS INDEX: 33.07 KG/M2

## 2023-02-15 DIAGNOSIS — E66.3 OVERWEIGHT: ICD-10-CM

## 2023-02-15 DIAGNOSIS — Z12.11 ENCOUNTER FOR SCREENING FOR MALIGNANT NEOPLASM OF COLON: ICD-10-CM

## 2023-02-15 PROCEDURE — 99203 OFFICE O/P NEW LOW 30 MIN: CPT

## 2023-02-15 RX ORDER — OXCARBAZEPINE 150 MG/1
150 TABLET, FILM COATED ORAL
Refills: 0 | Status: DISCONTINUED | COMMUNITY
End: 2023-02-15

## 2023-02-15 RX ORDER — SUMATRIPTAN 20 MG/1
SPRAY NASAL
Refills: 0 | Status: DISCONTINUED | COMMUNITY
End: 2023-02-15

## 2023-02-15 RX ORDER — VERAPAMIL HYDROCHLORIDE 180 MG/1
180 TABLET ORAL
Refills: 0 | Status: DISCONTINUED | COMMUNITY
End: 2023-02-15

## 2023-02-15 NOTE — CONSULT LETTER
[Dear  ___] : Dear  [unfilled], [Consult Letter:] : I had the pleasure of evaluating your patient, [unfilled]. [Please see my note below.] : Please see my note below. [Consult Closing:] : Thank you very much for allowing me to participate in the care of this patient.  If you have any questions, please do not hesitate to contact me. [Sincerely,] : Sincerely, [FreeTextEntry3] : Max Niño MD, FACP, AGAF, FAASLD\par  of Medicine\par [facility] [employer] [facility]\par

## 2023-02-15 NOTE — ASSESSMENT
[FreeTextEntry1] : 52-year-old male in excellent health who runs 3 to 4 miles a day referred for initial colorectal cancer screening.  He has occasional blood in the toilet paper when drinking alcohol.  There is no change in bowel habits.  There is no family history colorectal cancer.  He denies any chest pain shortness of breath or GERD.\par \par IMP:\par 1. average risk for colon cancer screening\par 2. Likely hemorrhoidal bleeding\par 3. Overweight\par \par PLAN:\par He was advised to undergo colonoscopy to which he  agreed. The procedure will be performed in [facility] with the assistance of an anesthesiologist. The procedure was explained in detail and he understood the risks of the procedure not limited  to infection, bleeding, perforation, risk of anesthesia and risk of IV site problems,emergency surgery, missed lesions  or non-diagnosis of colon cancer. He  was advised that he could not drive home alone, if the patient chooses to receive sedation. Further diagnostic and treatment recommendations will be based upon the procedure and any biopsies, if they are taken.\par

## 2023-02-15 NOTE — HISTORY OF PRESENT ILLNESS
[FreeTextEntry1] : 52-year-old male in excellent health who runs 3 to 4 miles a day referred for initial colorectal cancer screening.  He has occasional blood in the toilet paper when drinking alcohol.  There is no change in bowel habits.  There is no family history colorectal cancer.  He denies any chest pain shortness of breath or GERD.

## 2023-02-15 NOTE — PHYSICAL EXAM
[Alert] : alert [Normal Voice/Communication] : normal voice/communication [Healthy Appearing] : healthy appearing [No Acute Distress] : no acute distress [Sclera] : the sclera and conjunctiva were normal [Hearing Threshold Finger Rub Not [place]] : hearing was normal [Normal Lips/Gums] : the lips and gums were normal [Oropharynx] : the oropharynx was normal [Normal Appearance] : the appearance of the neck was normal [No Neck Mass] : no neck mass was observed [No Respiratory Distress] : no respiratory distress [No Acc Muscle Use] : no accessory muscle use [Respiration, Rhythm And Depth] : normal respiratory rhythm and effort [Auscultation Breath Sounds / Voice Sounds] : lungs were clear to auscultation bilaterally [Heart Rate And Rhythm] : heart rate was normal and rhythm regular [Normal S1, S2] : normal S1 and S2 [Murmurs] : no murmurs [Bowel Sounds] : normal bowel sounds [Abdomen Tenderness] : non-tender [No Masses] : no abdominal mass palpated [Abdomen Soft] : soft [] : no hepatosplenomegaly [Oriented To Time, Place, And Person] : oriented to person, place, and time

## 2023-04-18 ENCOUNTER — APPOINTMENT (OUTPATIENT)
Dept: GASTROENTEROLOGY | Facility: AMBULATORY SURGERY CENTER | Age: 53
End: 2023-04-18
Payer: COMMERCIAL

## 2023-04-18 ENCOUNTER — TRANSCRIPTION ENCOUNTER (OUTPATIENT)
Age: 53
End: 2023-04-18

## 2023-04-18 ENCOUNTER — RESULT REVIEW (OUTPATIENT)
Age: 53
End: 2023-04-18

## 2023-04-18 PROCEDURE — 45385 COLONOSCOPY W/LESION REMOVAL: CPT | Mod: 33

## 2023-04-19 ENCOUNTER — TRANSCRIPTION ENCOUNTER (OUTPATIENT)
Age: 53
End: 2023-04-19

## 2023-04-22 ENCOUNTER — TRANSCRIPTION ENCOUNTER (OUTPATIENT)
Age: 53
End: 2023-04-22

## 2023-05-25 ENCOUNTER — APPOINTMENT (OUTPATIENT)
Dept: GASTROENTEROLOGY | Facility: CLINIC | Age: 53
End: 2023-05-25

## 2023-08-30 NOTE — ED ADULT NURSE NOTE - CHPI ED NUR SYMPTOMS NEG
. no weakness/no change in level of consciousness/no disorientation/no weight loss/no paranoia/no hallucinations/no agitation/no fever/no suicidal

## 2024-03-11 ENCOUNTER — EMERGENCY (EMERGENCY)
Facility: HOSPITAL | Age: 54
LOS: 1 days | Discharge: HOME | End: 2024-03-11
Attending: STUDENT IN AN ORGANIZED HEALTH CARE EDUCATION/TRAINING PROGRAM
Payer: COMMERCIAL

## 2024-03-11 VITALS
HEIGHT: 70 IN | TEMPERATURE: 98 F | DIASTOLIC BLOOD PRESSURE: 78 MMHG | SYSTOLIC BLOOD PRESSURE: 117 MMHG | OXYGEN SATURATION: 97 % | RESPIRATION RATE: 16 BRPM | HEART RATE: 72 BPM | WEIGHT: 205.03 LBS

## 2024-03-11 VITALS
DIASTOLIC BLOOD PRESSURE: 83 MMHG | TEMPERATURE: 98 F | HEART RATE: 68 BPM | RESPIRATION RATE: 16 BRPM | SYSTOLIC BLOOD PRESSURE: 123 MMHG | OXYGEN SATURATION: 97 %

## 2024-03-11 DIAGNOSIS — S62.102A FRACTURE OF UNSPECIFIED CARPAL BONE, LEFT WRIST, INITIAL ENCOUNTER FOR CLOSED FRACTURE: Chronic | ICD-10-CM

## 2024-03-11 DIAGNOSIS — Z98.890 OTHER SPECIFIED POSTPROCEDURAL STATES: Chronic | ICD-10-CM

## 2024-03-11 LAB
ALBUMIN SERPL ELPH-MCNC: 4.7 G/DL — SIGNIFICANT CHANGE UP (ref 3.3–5)
ALP SERPL-CCNC: 87 U/L — SIGNIFICANT CHANGE UP (ref 40–120)
ALT FLD-CCNC: 17 U/L — SIGNIFICANT CHANGE UP (ref 10–45)
ANION GAP SERPL CALC-SCNC: 16 MMOL/L — SIGNIFICANT CHANGE UP (ref 5–17)
APTT BLD: 29.5 SEC — SIGNIFICANT CHANGE UP (ref 24.5–35.6)
AST SERPL-CCNC: 25 U/L — SIGNIFICANT CHANGE UP (ref 10–40)
BASOPHILS # BLD AUTO: 0.02 K/UL — SIGNIFICANT CHANGE UP (ref 0–0.2)
BASOPHILS NFR BLD AUTO: 0.3 % — SIGNIFICANT CHANGE UP (ref 0–2)
BILIRUB SERPL-MCNC: 0.4 MG/DL — SIGNIFICANT CHANGE UP (ref 0.2–1.2)
BUN SERPL-MCNC: 17 MG/DL — SIGNIFICANT CHANGE UP (ref 7–23)
CALCIUM SERPL-MCNC: 9.9 MG/DL — SIGNIFICANT CHANGE UP (ref 8.4–10.5)
CHLORIDE SERPL-SCNC: 104 MMOL/L — SIGNIFICANT CHANGE UP (ref 96–108)
CO2 SERPL-SCNC: 22 MMOL/L — SIGNIFICANT CHANGE UP (ref 22–31)
CREAT SERPL-MCNC: 0.9 MG/DL — SIGNIFICANT CHANGE UP (ref 0.5–1.3)
EGFR: 103 ML/MIN/1.73M2 — SIGNIFICANT CHANGE UP
EOSINOPHIL # BLD AUTO: 0.12 K/UL — SIGNIFICANT CHANGE UP (ref 0–0.5)
EOSINOPHIL NFR BLD AUTO: 2 % — SIGNIFICANT CHANGE UP (ref 0–6)
GLUCOSE SERPL-MCNC: 117 MG/DL — HIGH (ref 70–99)
HCT VFR BLD CALC: 43.4 % — SIGNIFICANT CHANGE UP (ref 39–50)
HGB BLD-MCNC: 15.1 G/DL — SIGNIFICANT CHANGE UP (ref 13–17)
IMM GRANULOCYTES NFR BLD AUTO: 0.2 % — SIGNIFICANT CHANGE UP (ref 0–0.9)
INR BLD: 0.99 RATIO — SIGNIFICANT CHANGE UP (ref 0.85–1.18)
LYMPHOCYTES # BLD AUTO: 1.69 K/UL — SIGNIFICANT CHANGE UP (ref 1–3.3)
LYMPHOCYTES # BLD AUTO: 28.6 % — SIGNIFICANT CHANGE UP (ref 13–44)
MCHC RBC-ENTMCNC: 32.5 PG — SIGNIFICANT CHANGE UP (ref 27–34)
MCHC RBC-ENTMCNC: 34.8 GM/DL — SIGNIFICANT CHANGE UP (ref 32–36)
MCV RBC AUTO: 93.5 FL — SIGNIFICANT CHANGE UP (ref 80–100)
MONOCYTES # BLD AUTO: 0.6 K/UL — SIGNIFICANT CHANGE UP (ref 0–0.9)
MONOCYTES NFR BLD AUTO: 10.2 % — SIGNIFICANT CHANGE UP (ref 2–14)
NEUTROPHILS # BLD AUTO: 3.46 K/UL — SIGNIFICANT CHANGE UP (ref 1.8–7.4)
NEUTROPHILS NFR BLD AUTO: 58.7 % — SIGNIFICANT CHANGE UP (ref 43–77)
NRBC # BLD: 0 /100 WBCS — SIGNIFICANT CHANGE UP (ref 0–0)
PLATELET # BLD AUTO: 203 K/UL — SIGNIFICANT CHANGE UP (ref 150–400)
POTASSIUM SERPL-MCNC: 4.3 MMOL/L — SIGNIFICANT CHANGE UP (ref 3.5–5.3)
POTASSIUM SERPL-SCNC: 4.3 MMOL/L — SIGNIFICANT CHANGE UP (ref 3.5–5.3)
PROT SERPL-MCNC: 7.9 G/DL — SIGNIFICANT CHANGE UP (ref 6–8.3)
PROTHROM AB SERPL-ACNC: 10.9 SEC — SIGNIFICANT CHANGE UP (ref 9.5–13)
RBC # BLD: 4.64 M/UL — SIGNIFICANT CHANGE UP (ref 4.2–5.8)
RBC # FLD: 12.4 % — SIGNIFICANT CHANGE UP (ref 10.3–14.5)
SODIUM SERPL-SCNC: 142 MMOL/L — SIGNIFICANT CHANGE UP (ref 135–145)
WBC # BLD: 5.9 K/UL — SIGNIFICANT CHANGE UP (ref 3.8–10.5)
WBC # FLD AUTO: 5.9 K/UL — SIGNIFICANT CHANGE UP (ref 3.8–10.5)

## 2024-03-11 PROCEDURE — 70450 CT HEAD/BRAIN W/O DYE: CPT | Mod: 26,MC

## 2024-03-11 PROCEDURE — 85025 COMPLETE CBC W/AUTO DIFF WBC: CPT

## 2024-03-11 PROCEDURE — 99284 EMERGENCY DEPT VISIT MOD MDM: CPT | Mod: 25

## 2024-03-11 PROCEDURE — 85610 PROTHROMBIN TIME: CPT

## 2024-03-11 PROCEDURE — 80053 COMPREHEN METABOLIC PANEL: CPT

## 2024-03-11 PROCEDURE — 86335 IMMUNFIX E-PHORSIS/URINE/CSF: CPT

## 2024-03-11 PROCEDURE — 85730 THROMBOPLASTIN TIME PARTIAL: CPT

## 2024-03-11 PROCEDURE — 70450 CT HEAD/BRAIN W/O DYE: CPT | Mod: MC

## 2024-03-11 PROCEDURE — 99285 EMERGENCY DEPT VISIT HI MDM: CPT

## 2024-03-11 NOTE — CONSULT NOTE ADULT - COMMENTS
Vital Signs Last 24 Hrs  T(C): 36.4 (11 Mar 2024 20:21), Max: 36.7 (11 Mar 2024 15:26)  T(F): 97.6 (11 Mar 2024 20:21), Max: 98.1 (11 Mar 2024 15:26)  HR: 68 (11 Mar 2024 20:21) (68 - 72)  BP: 123/83 (11 Mar 2024 20:21) (117/78 - 123/83)  BP(mean): --  RR: 16 (11 Mar 2024 20:21) (16 - 16)  SpO2: 97% (11 Mar 2024 20:21) (97% - 97%)    Parameters below as of 11 Mar 2024 20:21  Patient On (Oxygen Delivery Method): room air

## 2024-03-11 NOTE — ED PROVIDER NOTE - PATIENT PORTAL LINK FT
You can access the FollowMyHealth Patient Portal offered by [facility] by registering at the following website: [email or web address]. By joining [employer]’s FollowMyHealth portal, you will also be able to view your health information using other applications (apps) compatible with our system.

## 2024-03-11 NOTE — ED PROVIDER NOTE - NSFOLLOWUPINSTRUCTIONS_ED_ALL_ED_FT
You were seen for nasal discharge. Please follow up with ENT in 2 days. Return to the ER for fevers, chills, dizziness, lightheadedness, numbness, tingling, seizures, altered mental status. You were seen for nasal discharge. Please follow up with ENT in 2 days. Return to the ER for fevers, chills, dizziness, lightheadedness, numbness, tingling, seizures, altered mental status. We sent a sample of the fluid. Your ENT will relay the results to you.

## 2024-03-11 NOTE — CONSULT NOTE ADULT - SUBJECTIVE AND OBJECTIVE BOX
HPI: The patient is a 48 year old male with a past medical history significant for sinus surgery in 2018 who presents to the emergency room at [facility] with a chief complaint of facial pain and clear fluid leaking from the nose and mouth for the past several hours.  Reports he bent down to feed cat last night and noticed straw-colored fluid dripping from his nose. Woke up during night with congestion to L side of face/sinuses, and today reports same fluid leakage from L nostril occurs every time he bends over. Reports he otherwise feels well. Denies head injury, HA, dizziness      HIV:    HIV Status:  · Offered: Declined     PAST MEDICAL HISTORY   DVT (deep venous thrombosis)    Femur fracture, left  sx sx 2015  Wrist fracture, left.    PAST SURGICAL HISTORY:  H/O knee surgery  left  Left wrist fracture  s/p sx 2015.     Substance Use History:  · Substance Use	never used     Alcohol Use History:  · Have you ever consumed alcohol	never     Tobacco Usage:  · Tobacco Usage	Never smoker    ALLERGIES AND HOME MEDICATIONS:   Allergies:        Allergies:  	No Known Allergies:     Home Medications:   ·TEGretol 200 mg oral tablet: 1 tab(s) orally once a day   ·acetaminophen-oxyCODONE 325 mg-5 mg oral tablet: 1 tab(s) orally 4 to 6 times a day MDD:8  · Keflex 500 mg oral capsule: 1 cap(s) orally 2 times a day    LABS:  CBC Full  -  ( 11 Mar 2024 17:49 )  WBC Count : 5.90 K/uL  Hemoglobin : 15.1 g/dL  Hematocrit : 43.4 %  Platelet Count - Automated : 203 K/uL  Mean Cell Volume : 93.5 fl  Mean Cell Hemoglobin : 32.5 pg  Mean Cell Hemoglobin Concentration : 34.8 gm/dL  Auto Neutrophil # : 3.46 K/uL  Auto Lymphocyte # : 1.69 K/uL  Auto Monocyte # : 0.60 K/uL  Auto Eosinophil # : 0.12 K/uL  Auto Basophil # : 0.02 K/uL  Auto Neutrophil % : 58.7 %  Auto Lymphocyte % : 28.6 %  Auto Monocyte % : 10.2 %  Auto Eosinophil % : 2.0 %  Auto Basophil % : 0.3 %

## 2024-03-11 NOTE — ED PROVIDER NOTE - CARE PROVIDER_API CALL
Carloz Elmore  Otolaryngology  [address] [place] [id number]  Phone: [phone]  Fax: [phone]  Follow Up Time:

## 2024-03-11 NOTE — ED ADULT NURSE NOTE - CHIEF COMPLAINT QUOTE
L nostril leaking clear fluid since last night, only happens when he bends forward then stands up. had some congestion last night but states this is different than a runny nose. denies trauma or injury.  sent by his doctor for r/o csf leak

## 2024-03-11 NOTE — ED PROVIDER NOTE - PHYSICAL EXAMINATION
GENERAL: no acute distress, non-toxic appearing  HEAD: normocephalic, atraumatic  HEENT: normal conjunctiva, oral mucosa moist, neck supple  CARDIAC: regular rate and rhythm, normal S1 and S2,  no appreciable murmurs  PULM: clear to ascultation bilaterally, no crackles, rales, rhonchi, or wheezing  NEURO: alert and oriented x 3, normal speech, moving all extremities   MSK: no visible deformities, no peripheral edema, calf tenderness/redness/swelling  SKIN: no visible rashes, dry, well-perfused  PSYCH: appropriate mood and affect GENERAL: no acute distress, non-toxic appearing  HEAD: normocephalic, atraumatic  HEENT: normal conjunctiva, oral mucosa moist, neck supple, + yellow nasal discharge  CARDIAC: regular rate and rhythm, normal S1 and S2,  no appreciable murmurs  PULM: clear to ascultation bilaterally, no crackles, rales, rhonchi, or wheezing  NEURO: alert and oriented x 3, normal speech, moving all extremities   MSK: no visible deformities, no peripheral edema, calf tenderness/redness/swelling  SKIN: no visible rashes, dry, well-perfused  PSYCH: appropriate mood and affect

## 2024-03-11 NOTE — ED ADULT TRIAGE NOTE - CHIEF COMPLAINT QUOTE
L nostril leaking clear fluid since last night, only happens when he bends forward then stands up. had some congestion last night but states this is different than a runny nose. denies trauma or injury.  sent by his doctor for r/o csf leak 30-Nov-2020

## 2024-03-11 NOTE — ED PROVIDER NOTE - OBJECTIVE STATEMENT
52 yo M PMHx of seasonal allergies, sinus surgery 2018, presents with leaking fluid L nostril when he bent down to feed his cat yesterday. No trauma, headache, dizziness, fevers.

## 2024-03-11 NOTE — ED PROVIDER NOTE - CLINICAL SUMMARY MEDICAL DECISION MAKING FREE TEXT BOX
54 yo with hx of ENT surgeries with nasal discharge - VSS, nonfocal exam, will obtain CTH, ENT cs to rule out CSF leak.

## 2024-03-11 NOTE — ED PROVIDER NOTE - ATTENDING CONTRIBUTION TO CARE
History and physical as documented above.  Imaging as per NSGY, NSGY recs, symptom management, disposition pending work-up and response to treatment.

## 2024-03-11 NOTE — CONSULT NOTE ADULT - ENMT COMMENTS
Flexible Fiberoptic Laryngoscopy: clear nasopharynx to glottis, tvc mobile b/l, +GERD, airway patent

## 2024-03-11 NOTE — ED PROVIDER NOTE - RAPID ASSESSMENT
52yo M with PMH of seasonal allergies, sinus surgery in 2018 for clogged sinus cavity, presenting with leaking fluid from L nostril. Reports he bent down to feed cat last night and noticed straw-colored fluid dripping from his nose. Woke up during night with congestion to L side of face/sinuses, and today reports same fluid leakage from L nostril occurs every time he bends over. Reports he otherwise feels well. Denies head injury, HA, dizziness.     INEZ Farfan: Patient seen by myself in triage area for rapid assessment only. Full H&P and complete work-up to be performed in main emergency department by ED providers.     *17:35 NSG paged to clarify imaging. 52yo M with PMH of seasonal allergies, sinus surgery in 2018 for clogged sinus cavity, presenting with leaking fluid from L nostril. Reports he bent down to feed cat last night and noticed straw-colored fluid dripping from his nose. Woke up during night with congestion to L side of face/sinuses, and today reports same fluid leakage from L nostril occurs every time he bends over. Reports he otherwise feels well. Denies head injury, HA, dizziness.     INEZ Farfan: Patient seen by myself in triage area for rapid assessment only. Full H&P and complete work-up to be performed in main emergency department by ED providers.     *17:35 NSG paged to clarify imaging.  *17:40 spoke with NSG resident Pranay, recommending non-con CTH stereotactic scan. Will see patient.

## 2024-03-11 NOTE — CONSULT NOTE ADULT - NOSE
nasal/sinus endoscopy; Maxillary sinus with mucoid fluid b/l via inferior meatus, ss clear b/l, no e/o clear fluid or drainage on head bend or sitting upright/no discharge/inflamed mucosa/congestion

## 2024-03-11 NOTE — CONSULT NOTE ADULT - ASSESSMENT
Assessment:  The patient is a 48 year old male with a past medical history significant for sinus surgery in 2018 who presents to the emergency room at [facility] with a chief complaint of facial pain and clear fluid leaking from the nose and mouth for the past several hours. No evidence of CSF rhinorrhea. +Allergic rhinitis.     Plan:  1) flonase  2) f/u with ENT Dr. Elmore [phone]

## 2024-03-11 NOTE — ED PROVIDER NOTE - PROGRESS NOTE DETAILS
ENT paged - will scope patient    Sandra Weinstein MD, PGY3 Patient upset with wait time, nurse explained that we are waiting for ENT. Patient verbally abusive to nurse. Preparing AMA for patient however ENT just went into room    Sandra Weinstein MD, PGY3

## 2024-03-12 ENCOUNTER — NON-APPOINTMENT (OUTPATIENT)
Age: 54
End: 2024-03-12

## 2024-03-13 LAB
B2 TRANSFERRIN FLD QL: NEGATIVE — SIGNIFICANT CHANGE UP
B2 TRANSFERRIN FLD QL: NEGATIVE — SIGNIFICANT CHANGE UP

## 2024-03-15 ENCOUNTER — APPOINTMENT (OUTPATIENT)
Dept: OTOLARYNGOLOGY | Facility: CLINIC | Age: 54
End: 2024-03-15
Payer: COMMERCIAL

## 2024-03-15 VITALS
BODY MASS INDEX: 32.93 KG/M2 | WEIGHT: 230 LBS | SYSTOLIC BLOOD PRESSURE: 111 MMHG | HEIGHT: 70 IN | DIASTOLIC BLOOD PRESSURE: 76 MMHG | HEART RATE: 60 BPM

## 2024-03-15 DIAGNOSIS — Z86.69 PERSONAL HISTORY OF OTHER DISEASES OF THE NERVOUS SYSTEM AND SENSE ORGANS: ICD-10-CM

## 2024-03-15 DIAGNOSIS — J32.9 CHRONIC SINUSITIS, UNSPECIFIED: ICD-10-CM

## 2024-03-15 DIAGNOSIS — J33.9 NASAL POLYP, UNSPECIFIED: ICD-10-CM

## 2024-03-15 DIAGNOSIS — J34.89 OTHER SPECIFIED DISORDERS OF NOSE AND NASAL SINUSES: ICD-10-CM

## 2024-03-15 DIAGNOSIS — J34.2 DEVIATED NASAL SEPTUM: ICD-10-CM

## 2024-03-15 PROCEDURE — 31231 NASAL ENDOSCOPY DX: CPT

## 2024-03-15 PROCEDURE — 99214 OFFICE O/P EST MOD 30 MIN: CPT | Mod: 25

## 2024-03-15 RX ORDER — PREDNISONE 10 MG
TABLET ORAL
Refills: 0 | Status: COMPLETED | COMMUNITY
End: 2024-03-15

## 2024-03-15 RX ORDER — SODIUM PICOSULFATE, MAGNESIUM OXIDE, AND ANHYDROUS CITRIC ACID 10; 3.5; 12 MG/160ML; G/160ML; G/160ML
10-3.5-12 MG-GM LIQUID ORAL
Qty: 320 | Refills: 0 | Status: COMPLETED | COMMUNITY
Start: 2023-02-15 | End: 2024-03-15

## 2024-03-15 RX ORDER — FEXOFENADINE HYDROCHLORIDE 60 MG/1
TABLET, FILM COATED ORAL
Refills: 0 | Status: ACTIVE | COMMUNITY

## 2024-03-15 RX ORDER — METHYLPREDNISOLONE 4 MG/1
4 TABLET ORAL
Qty: 1 | Refills: 0 | Status: ACTIVE | COMMUNITY
Start: 2024-03-15 | End: 1900-01-01

## 2024-03-15 RX ORDER — FLUTICASONE PROPIONATE 50 UG/1
50 SPRAY, METERED NASAL DAILY
Qty: 1 | Refills: 5 | Status: COMPLETED | COMMUNITY
Start: 2019-08-30 | End: 2024-03-15

## 2024-03-15 RX ORDER — FLUTICASONE PROPIONATE 50 UG/1
50 SPRAY, METERED NASAL DAILY
Qty: 1 | Refills: 2 | Status: ACTIVE | COMMUNITY
Start: 2024-03-15 | End: 1900-01-01

## 2024-03-15 RX ORDER — POLYETHYLENE GLYCOL-3350 AND ELECTROLYTES 236; 6.74; 5.86; 2.97; 22.74 G/274.31G; G/274.31G; G/274.31G; G/274.31G; G/274.31G
236 POWDER, FOR SOLUTION ORAL
Qty: 1 | Refills: 0 | Status: COMPLETED | COMMUNITY
Start: 2023-04-05 | End: 2024-03-15

## 2024-03-15 NOTE — PHYSICAL EXAM
[Midline] : trachea located in midline position [Normal] : no rashes [FreeTextEntry1] : nasal discharge [Nasal Endoscopy Performed] : nasal endoscopy was performed, see procedure section for findings [de-identified] : L maxillary polyp, orange secretions

## 2024-03-15 NOTE — PROCEDURE
[Video Captured] : video captured and filed [Image(s) Captured] : image(s) captured and filed [Recalcitrant Symptoms] : recalcitrant symptoms  [Anterior rhinoscopy insufficient to account for symptoms] : anterior rhinoscopy insufficient to account for symptoms [Topical Lidocaine] : topical lidocaine [Oxymetazoline HCl] : oxymetazoline HCl [Flexible Endoscope] : examined with the flexible endoscope [Serial Number: ___] : Serial Number: [unfilled] [Normal] : the middle meatus had no abnormalities [___ cm] : [unfilled]Ucm maxillary polyp(s) on the left [Paranasal Sinuses Ethmoid Sinus] : no ethmoid polyps [Paranasal Sinuses Sphenoid Sinus] : no sphenoid polyps [FreeTextEntry6] : Pre-op indication(s): nasal discharge Post-op indication(s): nasal polyp L maxillary sinus Verbal consent obtained from patient. Anterior rhinoscopy insufficient to account for symptoms  Details for procedure:  Scope #: [id number] Type of scope:    flexible fiber optic telescope     Anesthesia and/or vasoconstriction was achieved topically by usin% Lidocaine spray   0.05% Oxymetazoline     Other ______  The following anatomic sites were directly examined in a sequential fashion:  The scope was introduced in the nasal passage between the middle and inferior turbinates to exam the inferior portion of the middle meatus and the fontanelle, as well as the maxillary ostia. Next, the scope was passed medially and posteriorly to the middle turbinates to examine the sphenoethmoid recess and the superior turbinate region.  Upon visualization the finders are as follows:  Nasal Septum:   Normal   Bleeding site cauterized:    Anterior   left   right   Posterior   left   right  Method:   Silver Nitrate   YAG Laser    Electrocautery ______  Right Side:  * Mucosa: Normal * Mucous: Normal * Polyp: Normal * Inferior Turbinate: Normal * Middle Turbinate: Normal * Superior Turbinate: Normal * Inferior Meatus: Normal * Middle Meatus: Normal * Super Meatus: Normal * Sphenoethmoidal Recess: Normal Left Side:  * Mucosa: Normal * Mucous: Normal * Polyp: Normal * Inferior Turbinate: Normal * Middle Turbinate: Normal * Superior Turbinate: Normal * Inferior Meatus: Normal * Middle Meatus: Normal * Super Meatus: Normal * Sphenoethmoidal Recess: Normal The patient tolerated the procedure well without any complications.

## 2024-03-15 NOTE — HISTORY OF PRESENT ILLNESS
[de-identified] : 53 year old male presents for complaints of left nasal discharge. History of s/p Septoplasty, turbinectomy, left endoscopic intraoperative CT guided sphenoidectomy, ethmoidectomy, maxillary antrostomy, and frontal sinus exploration 12/11/18. Reports mild nasal congestion and clear nasal drainage from left nostril starting Sunday. States he noticed nasal drainage after bending forward. Patient followed up with PCP, recommended patient be seen at hospital. Had nasal discharge collected, beta transferase test, showed negative for CSF leak. Completed CT scan at [place]. States nasal discharge has resolved since Tuesday morning. States he has had no problem after surgery.

## 2024-03-15 NOTE — ADDENDUM
[FreeTextEntry1] : Scribe Attestation: I, Clem Patel, am scribing for and in the presence of Dr. Carloz Elmore in the following sections HISTORY OF PRESENT ILLNESS, PAST MEDICAL/FAMILY/SOCIAL HISTORY; REVIEW OF SYSTEMS; VITAL SIGNS; PHYSICAL EXAM; PROCEDURES; DISCUSSION.   I, Dr. Carloz Elmore, personally performed the services described in the documentation, reviewed the documentation recorded by the scribe in my presence, and it accurately and completely records my words and actions.

## 2024-05-22 ENCOUNTER — APPOINTMENT (OUTPATIENT)
Dept: OTOLARYNGOLOGY | Facility: CLINIC | Age: 54
End: 2024-05-22

## 2024-08-09 NOTE — ED PROVIDER NOTE - CROS ED MUSC ALL NEG
Decrease Lantus 32 units  Get glucose tablets  Call Pharm.D. needs to be seen sooner ask them for order for continuous glucose monitoring hypoglycemic episode    PHARM D  Debo Blue, PHARMD   [facility]  Chronic Disease Management Department, Second Floor   [address] [place]  [address]   please call [phone]     negative...

## 2024-08-16 NOTE — ED ADULT NURSE NOTE - CHIEF COMPLAINT
H&P reviewed. After examining the patient I find no changes in the patients condition since the H&P had been written.    Vitals:    08/16/24 1155   BP: 124/58   Pulse: 61   Resp: 21   Temp: 98.4 °F (36.9 °C)   SpO2: 99%     
The patient is a 53y Male complaining of